# Patient Record
Sex: FEMALE | Race: WHITE
[De-identification: names, ages, dates, MRNs, and addresses within clinical notes are randomized per-mention and may not be internally consistent; named-entity substitution may affect disease eponyms.]

---

## 2020-07-19 ENCOUNTER — HOSPITAL ENCOUNTER (EMERGENCY)
Dept: HOSPITAL 76 - ED | Age: 60
Discharge: HOME | End: 2020-07-19
Payer: MEDICAID

## 2020-07-19 VITALS — SYSTOLIC BLOOD PRESSURE: 128 MMHG | DIASTOLIC BLOOD PRESSURE: 66 MMHG

## 2020-07-19 DIAGNOSIS — L03.116: ICD-10-CM

## 2020-07-19 DIAGNOSIS — L02.416: Primary | ICD-10-CM

## 2020-07-19 PROCEDURE — 99282 EMERGENCY DEPT VISIT SF MDM: CPT

## 2020-07-19 PROCEDURE — 99283 EMERGENCY DEPT VISIT LOW MDM: CPT

## 2020-07-19 NOTE — ED PHYSICIAN DOCUMENTATION
PD HPI SKIN





- Stated complaint


Stated Complaint: LT LEG BUG BITE/PURPLE





- Chief complaint


Chief Complaint: Wound





- History obtained from


History obtained from: Patient





- Additional information


Additional information: 


Patient comes emergency department complaining of what she believes is a bug 

bite to her left inner thigh.  Patient states that this is been getting worse 

for the last 3 days and that she is concerned that she may have a spider bite.  

Patient denies fevers or chills.  She states that she does not have any other 

lesions anywhere else.  No other complaints at this time.








Review of Systems


Ten Systems: 10 systems reviewed and negative


Constitutional: reports: Reviewed and negative


Eyes: reports: Reviewed and negative


Ears: reports: Reviewed and negative


Nose: reports: Reviewed and negative


Throat: reports: Reviewed and negative


Cardiac: reports: Reviewed and negative


Respiratory: reports: Reviewed and negative


GI: reports: Reviewed and negative


: reports: Reviewed and negative


Skin: reports: Lesions


Musculoskeletal: reports: Reviewed and negative


Neurologic: reports: Reviewed and negative


Psychiatric: reports: Reviewed and negative


Endocrine: reports: Reviewed and negative


Immunocompromised: reports: Reviewed and negative





PD PAST MEDICAL HISTORY





- Present Medications


Home Medications: 


                                Ambulatory Orders











 Medication  Instructions  Recorded  Confirmed


 


Sulfamethox/Trimeth 800/160 1 each PO BID #14 tablet 07/19/20 





[Bactrim Ds 800/160]   














- Allergies


Allergies/Adverse Reactions: 


                                    Allergies











Allergy/AdvReac Type Severity Reaction Status Date / Time


 


Penicillins Allergy  Anaphylaxis Verified 07/19/20 16:20














- Social History


Does the pt smoke?: No


Smoking Status: Never smoker





PD ED PE NORMAL





- Vitals


Vital signs reviewed: Yes





- General


General: Alert and oriented X 3, No acute distress





- HEENT


HEENT: Atraumatic, PERRL, EOMI, Moist mucous membranes





- Neck


Neck: Supple, no meningeal sign





- Respiratory


Respiratory: No respiratory distress





- Derm


Derm: Warm and dry, Other (6 cm diameter area of erythema the patient's left 

medial superior most thigh.  Approximately 3 cm diameter area of induration 

centrally located with skin breakage and purple discoloration.  Small amount of 

thick, dark blood draining centrally.  Very tender.)





- Extremities


Extremities: No deformity





- Neuro


Neuro: Alert and oriented X 3





- Psych


Psych: Normal mood, Normal affect





Results





- Vitals


Vitals: 


                                     Oxygen











O2 Source                      Room air

















PD MEDICAL DECISION MAKING





- ED course


Complexity details: considered differential, d/w patient, d/w family


ED course: 


I discussed with the patient that at this point in time, she has a very small 

area of induration with most likely, a smaller, centrally located area of 

hemorrhage or purulent material.  At this point, I have given her the option of 

antibiotics and hot packing at home, given the likely small size of any pus 

collection versus I&D here in the emergency department.  The patient has had 

difficulty tolerating my exam and has repeatedly tried to bat my hand away from 

the area.  I have discussed with her that if I am to perform an I&D, I cannot 

have her doing this as it is not safe for either her or myself.  I do feel that 

given the small size of the actual area of induration, that there is a good 

chance that with hot packing and antibiotics, this would clear up on its own.  

However, I discussed with the patient that if she goes with this option, if she 

notices that after 3 days of antibiotics her erythema is not improving or is 

worsening, she will need to return to the emergency department for I&D.  The 

patient was given time to think about the options and discuss with her friend, 

who accompanied her.  The patient ultimately decided that she would like to try 

antibiotics and hot packing first.  We have discussed that she should hot pack 

several times a day for 20 to 30 minutes.  We have discussed the usual 

indications for return, as well.  The patient was given a dose of Bactrim here 

in the emergency department.








Departure





- Departure


Disposition: 01 Home, Self Care


Clinical Impression: 


 Abscess





Cellulitis


Qualifiers:


 Site of cellulitis: extremity Site of cellulitis of extremity: lower extremity 

Laterality: left Qualified Code(s): L03.116 - Cellulitis of left lower limb





Condition: Stable


Instructions:  Cellulitis Dc


Prescriptions: 


Sulfamethox/Trimeth 800/160 [Bactrim Ds 800/160] 1 each PO BID #14 tablet


Comments: 


It is not clear what caused the infection you have, though a bug bite, spider or

otherwise, certainly may have started this.  You have opted for now to take 

antibiotics and hot pack.  Given the fairly small size of the actual Firm area 

itself, there is most likely a fairly small amount of pus which has a good 

chance of responding to antibiotics.  However, if you do not notice any 

improvement after the next 3 days, or if you notice that the redness is 

spreading and getting worse, then you will need to return to the emergency 

department and most likely have the area lanced.  Please apply apply either a 

hot, wet towel or washcloth to the area for 20 to 30 minutes at a time, 4 times 

a day, or soak the area in the hottest water you can stand for 20 to 30 minutes 

at a time 4 hours a day, every day.  This will help encouraged drainage of pus.


Discharge Date/Time: 07/19/20 17:29